# Patient Record
Sex: FEMALE | Race: OTHER | Employment: OTHER | URBAN - METROPOLITAN AREA
[De-identification: names, ages, dates, MRNs, and addresses within clinical notes are randomized per-mention and may not be internally consistent; named-entity substitution may affect disease eponyms.]

---

## 2020-06-03 NOTE — PATIENT DISCUSSION
DISCUSSED THAT LIGHT SENSITIVITY AFTER CATARACT SURGERY IS EXPECTED. DISCUSSED THAT PT IS STILL HEALING. MILD INFLAMMATION IS NORMAL. CORNEA SHOULD CONTINUE TO HEAL NORMALLY. PT WAS REASSURED SURGERY LOOKS GREAT. PT REPORTS MILD POSITIVE DYSPHOTOPSIA AND DISCUSSED THE PT SHOULD ADJUST WITH TIME. OTHERWISE OPTIONS CAN BE DISCUSSED W HER SURGEON.

## 2020-06-03 NOTE — PATIENT DISCUSSION
Recommended observation. RECOMMEND MONITORING BP AND WILL REASSESS IN 3 MO GIVEN ASYMMETRY OF CHANGES IN FUNDUS.

## 2020-11-13 NOTE — PATIENT DISCUSSION
11/13/20: No retinal tears or retinal detachment seen on clinical exam today. Reviewed the signs and symptoms of retinal tear/retinal detachment and the importance of calling for prompt evaluation should there be increasing floaters, new flashing lights, or decreasing peripheral vision in either eye at any time. Observation recommended.

## 2021-03-22 NOTE — PROCEDURE NOTE: CLINICAL
PROCEDURE NOTE: Laser for Retinal Tear OS. Diagnosis: Unspecified Retinal Break. Anesthesia: Topical. Prior to laser, risks/benefits/alternatives to laser discussed including loss of vision, decreased peripheral and night vision, need for more laser and/or surgery and patient wished to proceed. An informed consent was obtained and no assurances or guarantees were given. Spot size: 200 um. Pulse power: 200 mW. Pulse duration: 200 ms. Number of pulses: 108. Procedure Time: 3:30 pm. Patient tolerated procedure well. There were no complications. Post-op instructions given. Patient given office phone number/answering service number and advised to call immediately should there be loss of vision or pain, or should they have any other questions or concerns. Rafia Hatch

## 2021-03-22 NOTE — PATIENT DISCUSSION
3/22/21: Based on today’s exam, diagnostic studies, and/or review of records, the determination was made for LASER today. Pt understands even with treatment it is possible the tear could progress into a retinal detachment and/or a new tear could develop and lead to a retinal detachment. We have reviewed the treatment's risks, benefits, and alternatives and patient wants to proceed with treatment.

## 2021-03-22 NOTE — PATIENT DISCUSSION
We reviewed the SIGNS AND SYMPTOMS of RETINAL TEAR/RETINAL DETACHMENT including floaters, new flashing lights, or decreased central or peripheral vision in either eye at any time and the importance of PROMPT EVALUATION should these occur.

## 2021-04-13 NOTE — PATIENT DISCUSSION
4/13/21: DISCUSSED AT LENGTH W PT THAT HVF FAILED TO SHOW ANY ABNORMALITIES. [facility] [place] LOOKS WNL & RETINA W/O NEW TEARS OR HOLES. RECOMMEND CONSIDERING 1. PILOCARPINE (TO REDUCE BRIGHTNESS) Y POS DYSPHOTOPSIAS-LIKE SX. -AFTER APPROVAL FROM DR Manjit Teresa. OR 2. CONSIDER YAG TO DETERMINE IF WRINKLES MAY CAUSE SOME REFRACTIVE LIGHT PROBLEMS.

## 2021-08-04 NOTE — PATIENT DISCUSSION
Capsular haze appears visually significant, RECOMMEND CONSULT with DR LINDO for possible YAG capsulotomy.

## 2021-08-04 NOTE — PATIENT DISCUSSION
4/13/21: DISCUSSED AT LENGTH W PT THAT HVF FAILED TO SHOW ANY ABNORMALITIES. [address] Zamudio 73 LOOKS WNL & RETINA W/O NEW TEARS OR HOLES. RECOMMEND CONSIDERING 1. PILOCARPINE (TO REDUCE BRIGHTNESS) Y POS DYSPHOTOPSIAS-LIKE SX. -AFTER APPROVAL FROM DR Sakshi Lewis. OR 2. CONSIDER YAG TO DETERMINE IF WRINKLES MAY CAUSE SOME REFRACTIVE LIGHT PROBLEMS.

## 2021-10-15 NOTE — PATIENT DISCUSSION
4/13/21: DISCUSSED AT LENGTH W PT THAT HVF FAILED TO SHOW ANY ABNORMALITIES. Willi Zamudio 73 LOOKS WNL & RETINA W/O NEW TEARS OR HOLES. RECOMMEND CONSIDERING 1. PILOCARPINE (TO REDUCE BRIGHTNESS) Y POS DYSPHOTOPSIAS-LIKE SX. -AFTER APPROVAL FROM DR Beata Lutz. OR 2. CONSIDER YAG TO DETERMINE IF WRINKLES MAY CAUSE SOME REFRACTIVE LIGHT PROBLEMS.

## 2021-10-15 NOTE — PATIENT DISCUSSION
10/15/21: IT IS POSSIBLE SECONDARY TO TIMELINE OF EVENTS. HAVE PROVIDED INFORMATION AND REASSESS NEXT VISIT.

## 2021-11-01 NOTE — PATIENT DISCUSSION
4/13/21: DISCUSSED AT LENGTH W PT THAT HVF FAILED TO SHOW ANY ABNORMALITIES. Willi Zamudio 73 LOOKS WNL & RETINA W/O NEW TEARS OR HOLES. RECOMMEND CONSIDERING 1. PILOCARPINE (TO REDUCE BRIGHTNESS) Y POS DYSPHOTOPSIAS-LIKE SX. -AFTER APPROVAL FROM DR Madeline Montemayor. OR 2. CONSIDER YAG TO DETERMINE IF WRINKLES MAY CAUSE SOME REFRACTIVE LIGHT PROBLEMS.

## 2021-11-01 NOTE — PATIENT DISCUSSION
11/1/21: PT W BATTERY ACID CONTACT YESTERDAY. CILIARY FLUSH. NO EVIDENCE OF INTRAOCULAR INFLAMMATION. MIN K SPK, NO EVIDENCE OF CORNEAL BURN. LAMONT VIDENCE OF LIMBAL CELL DAMAGE. WE'LL CONT KETOROLAC QID AND VIT C. REASSESS IN  1 WK OR SOOENR PRN.

## 2021-11-08 NOTE — PATIENT DISCUSSION
4/13/21: DISCUSSED AT LENGTH W PT THAT HVF FAILED TO SHOW ANY ABNORMALITIES. Willi Zamudio 73 LOOKS WNL & RETINA W/O NEW TEARS OR HOLES. RECOMMEND CONSIDERING 1. PILOCARPINE (TO REDUCE BRIGHTNESS) Y POS DYSPHOTOPSIAS-LIKE SX. -AFTER APPROVAL FROM DR Usman Walton. OR 2. CONSIDER YAG TO DETERMINE IF WRINKLES MAY CAUSE SOME REFRACTIVE LIGHT PROBLEMS.

## 2021-12-21 NOTE — PATIENT DISCUSSION
4/13/21: DISCUSSED AT LENGTH W PT THAT HVF FAILED TO SHOW ANY ABNORMALITIES. Willi Zmaudio 73 LOOKS WNL & RETINA W/O NEW TEARS OR HOLES. RECOMMEND CONSIDERING 1. PILOCARPINE (TO REDUCE BRIGHTNESS) Y POS DYSPHOTOPSIAS-LIKE SX. -AFTER APPROVAL FROM DR Dennise Damon. OR 2. CONSIDER YAG TO DETERMINE IF WRINKLES MAY CAUSE SOME REFRACTIVE LIGHT PROBLEMS.

## 2021-12-21 NOTE — PATIENT DISCUSSION
Recommended OBSERVATION. Patient understands condition, prognosis and need for follow up care. WE'LL MONITOR FOR RESOLUTION IN 3 MO OR SOONER PRN.

## 2022-01-31 NOTE — PATIENT DISCUSSION
Follow up with Dr Claudell Gent with EKG in 6 months with device check  3 month carelink  Continue Entresto 49/51mg tablet  twice a day  Increase Metoprolol to 50mg daily MONITOR response to TREATMENT.

## 2022-03-16 ENCOUNTER — CONSULTATION/EVALUATION (OUTPATIENT)
Dept: URBAN - METROPOLITAN AREA CLINIC 32 | Facility: CLINIC | Age: 67
End: 2022-03-16

## 2022-03-16 DIAGNOSIS — H40.1134: ICD-10-CM

## 2022-03-16 PROCEDURE — 92133 CPTRZD OPH DX IMG PST SGM ON: CPT

## 2022-03-16 PROCEDURE — 76514 ECHO EXAM OF EYE THICKNESS: CPT

## 2022-03-16 PROCEDURE — 92004 COMPRE OPH EXAM NEW PT 1/>: CPT

## 2022-03-16 PROCEDURE — 92020 GONIOSCOPY: CPT

## 2022-03-16 ASSESSMENT — TONOMETRY
OD_IOP_MMHG: 14
OS_IOP_MMHG: 16

## 2022-03-16 ASSESSMENT — VISUAL ACUITY
OS_CC: 20/20-2
OD_CC: 20/20-2

## 2022-03-16 ASSESSMENT — PACHYMETRY
OS_CT_UM: 450
OD_CT_UM: 475

## 2022-03-16 NOTE — PATIENT DISCUSSION
Discussed possibility of iStent at time of CE or another type of glaucoma procedure to reduce and control IOP to protect from further optic nerve damage and permanent vision loss.

## 2022-03-16 NOTE — PATIENT DISCUSSION
Discussed option of CE to improve visual function and possibility of iStent at time of CE or another type of glaucoma procedure to reduce and control IOP to protect from further optic nerve damage and permanent vision loss. Pt plans to discuss option with  in [facility]) at next appt.

## 2022-03-16 NOTE — PATIENT DISCUSSION
Pt follows with  in [facility] ([facility] Pt has next appt there May 12th.

## 2022-03-16 NOTE — PATIENT DISCUSSION
RTC here in 1 year (when returns to [place]) for IOP Check and OCT ONH, or as directed by  in [place] ([facility]).

## 2022-03-16 NOTE — PATIENT DISCUSSION
R/R for outside visits and testing. Pt unsure if will be able to get records. Advised Pt if unable to get records from Dr would like for pt to bring current [facility] records to next visit.

## 2022-10-11 NOTE — PATIENT DISCUSSION
4/13/21: DISCUSSED AT LENGTH W PT THAT HVF FAILED TO SHOW ANY ABNORMALITIES. Willi Zamudio 73 LOOKS WNL & RETINA W/O NEW TEARS OR HOLES. RECOMMEND CONSIDERING 1. PILOCARPINE (TO REDUCE BRIGHTNESS) Y POS DYSPHOTOPSIAS-LIKE SX. -AFTER APPROVAL FROM DR Trenton Valencia. OR 2. CONSIDER YAG TO DETERMINE IF WRINKLES MAY CAUSE SOME REFRACTIVE LIGHT PROBLEMS.

## 2022-10-11 NOTE — PATIENT DISCUSSION
10/11/22: PROGRESSIN OS, WE'LL TREAT CME AND REASSESS. IF WORSENS DECREASING VISION, WE WILL CONSIDER SX.  CONT TO MONITOR. STABLE IN OD.

## 2022-11-14 NOTE — PROCEDURE NOTE: CLINICAL
PROCEDURE NOTE: Intravitreal Ozurdex OS. Diagnosis: Posterior Uveitis. Anesthesia: Subconjunctival. Prep: Betadine Flush. Prior to injection, risks/benefits/alternatives discussed including infection, loss of vision, hemorrhage, cataract, glaucoma, retinal tears or detachment and patient wished to proceed. The patient was seated in the examination chair. Topical anesthesia was induced with Proparicaine 0.5%. Subconjunctival xylocaine 2% approximately 0.5 cc was given for anesthesia. Betadine Prep was performed. The Ozurdex drug implant (dexamethasone 0.7 mg intravitreal implant) was injected into the vitreous cavity. The needle was passed 3.0 mm posterior to the limbus in pseudophakic patients, and 3.5 mm posterior to the limbus in phakic patients. The eye was stabilized using a q tip or cotton tip applicator, and the conjunctiva was displaced from the injection site. The remainder of the intravitreal Ozurdex in the single-use vial was then discarded in a medical waste disposal container. The implant settled inferiorly. The retina was examined following the injection. There was no evidence of hemorrhage, subretinal injection, or retinal detachment. Patient tolerated procedure well. There were no complications. Post-op instructions given. Lot # [id number]. Expiration date: 5:05/5:17. The patient was instructed of a follow up appointment in approximately 6 weeks for a limited exam and pressure check and was told to call immediately if the vision decreases and/or if the patient’s eye becomes red, painful, and/or light sensitive. If the patient is unable to reach the doctor within an hour or two, the patient is instructed to go to the emergency room or call 911. Inventory Id: *. The patient was instructed to use Artificial Tears q.i.d. p.r.n for comfort. Ivonne Laguna

## 2022-11-14 NOTE — PATIENT DISCUSSION
4/13/21: DISCUSSED AT LENGTH W PT THAT HVF FAILED TO SHOW ANY ABNORMALITIES. Willi Zamudio 73 LOOKS WNL & RETINA W/O NEW TEARS OR HOLES. RECOMMEND CONSIDERING 1. PILOCARPINE (TO REDUCE BRIGHTNESS) Y POS DYSPHOTOPSIAS-LIKE SX. -AFTER APPROVAL FROM DR Liban Caballero. OR 2. CONSIDER YAG TO DETERMINE IF WRINKLES MAY CAUSE SOME REFRACTIVE LIGHT PROBLEMS.

## 2022-11-28 NOTE — PATIENT DISCUSSION
4/13/21: DISCUSSED AT LENGTH W PT THAT HVF FAILED TO SHOW ANY ABNORMALITIES. Willi Zamudio 73 LOOKS WNL & RETINA W/O NEW TEARS OR HOLES. RECOMMEND CONSIDERING 1. PILOCARPINE (TO REDUCE BRIGHTNESS) Y POS DYSPHOTOPSIAS-LIKE SX. -AFTER APPROVAL FROM DR Padmaja Kaye. OR 2. CONSIDER YAG TO DETERMINE IF WRINKLES MAY CAUSE SOME REFRACTIVE LIGHT PROBLEMS.

## 2022-12-27 NOTE — PATIENT DISCUSSION
12/27/22: DISCUSSED AT LENGHT MY ADVICE REGARDING CONSIDERING SURGERY TO REDUCE RISK OF FURTHER VISUAL LOSS AND SX. PT TO CONSIDER IT.

## 2022-12-27 NOTE — PATIENT DISCUSSION
4/13/21: DISCUSSED AT LENGTH W PT THAT HVF FAILED TO SHOW ANY ABNORMALITIES. Willi Zamudio 73 LOOKS WNL & RETINA W/O NEW TEARS OR HOLES. RECOMMEND CONSIDERING 1. PILOCARPINE (TO REDUCE BRIGHTNESS) Y POS DYSPHOTOPSIAS-LIKE SX. -AFTER APPROVAL FROM DR [employer] [facility]. OR 2. CONSIDER YAG TO DETERMINE IF WRINKLES MAY CAUSE SOME REFRACTIVE LIGHT PROBLEMS.

## 2023-05-03 NOTE — PATIENT DISCUSSION
10/11/22: PROGRESSIN OS, WE'LL TREAT CME AND REASSESS. IF WORSENS DECREASING VISION, WE WILL CONSIDER SX.  CONT TO MONITOR. STABLE IN OD. Strep test in office positive Take zpak as prescribed for sinus infection/strep throat  Continue over the counter cough/cold medications as needed for symptoms  If symptoms worsen or do not improve please follow-up with PCP or return to clinic

## 2023-09-07 NOTE — PATIENT DISCUSSION
8/6/21: ERM HAS SLIGHTLY PROGRESSED IN OU, BUT STILL does NOT APPEAR VISUALLY SIGNIFICANT. impairments found
